# Patient Record
Sex: FEMALE | Race: WHITE | NOT HISPANIC OR LATINO | Employment: FULL TIME | URBAN - METROPOLITAN AREA
[De-identification: names, ages, dates, MRNs, and addresses within clinical notes are randomized per-mention and may not be internally consistent; named-entity substitution may affect disease eponyms.]

---

## 2022-08-11 ENCOUNTER — TELEPHONE (OUTPATIENT)
Dept: SCHEDULING | Facility: IMAGING CENTER | Age: 42
End: 2022-08-11

## 2022-10-03 SDOH — ECONOMIC STABILITY: HOUSING INSECURITY
IN THE LAST 12 MONTHS, WAS THERE A TIME WHEN YOU DID NOT HAVE A STEADY PLACE TO SLEEP OR SLEPT IN A SHELTER (INCLUDING NOW)?: NO

## 2022-10-03 SDOH — ECONOMIC STABILITY: INCOME INSECURITY: IN THE LAST 12 MONTHS, WAS THERE A TIME WHEN YOU WERE NOT ABLE TO PAY THE MORTGAGE OR RENT ON TIME?: NO

## 2022-10-03 SDOH — ECONOMIC STABILITY: INCOME INSECURITY: HOW HARD IS IT FOR YOU TO PAY FOR THE VERY BASICS LIKE FOOD, HOUSING, MEDICAL CARE, AND HEATING?: NOT HARD AT ALL

## 2022-10-03 SDOH — ECONOMIC STABILITY: FOOD INSECURITY: WITHIN THE PAST 12 MONTHS, THE FOOD YOU BOUGHT JUST DIDN'T LAST AND YOU DIDN'T HAVE MONEY TO GET MORE.: NEVER TRUE

## 2022-10-03 SDOH — ECONOMIC STABILITY: HOUSING INSECURITY: IN THE LAST 12 MONTHS, HOW MANY PLACES HAVE YOU LIVED?: 2

## 2022-10-03 SDOH — HEALTH STABILITY: PHYSICAL HEALTH: ON AVERAGE, HOW MANY MINUTES DO YOU ENGAGE IN EXERCISE AT THIS LEVEL?: 20 MIN

## 2022-10-03 SDOH — ECONOMIC STABILITY: FOOD INSECURITY: WITHIN THE PAST 12 MONTHS, YOU WORRIED THAT YOUR FOOD WOULD RUN OUT BEFORE YOU GOT MONEY TO BUY MORE.: NEVER TRUE

## 2022-10-03 SDOH — HEALTH STABILITY: MENTAL HEALTH
STRESS IS WHEN SOMEONE FEELS TENSE, NERVOUS, ANXIOUS, OR CAN'T SLEEP AT NIGHT BECAUSE THEIR MIND IS TROUBLED. HOW STRESSED ARE YOU?: ONLY A LITTLE

## 2022-10-03 SDOH — HEALTH STABILITY: PHYSICAL HEALTH: ON AVERAGE, HOW MANY DAYS PER WEEK DO YOU ENGAGE IN MODERATE TO STRENUOUS EXERCISE (LIKE A BRISK WALK)?: 3 DAYS

## 2022-10-03 SDOH — ECONOMIC STABILITY: TRANSPORTATION INSECURITY
IN THE PAST 12 MONTHS, HAS THE LACK OF TRANSPORTATION KEPT YOU FROM MEDICAL APPOINTMENTS OR FROM GETTING MEDICATIONS?: NO

## 2022-10-03 SDOH — ECONOMIC STABILITY: TRANSPORTATION INSECURITY
IN THE PAST 12 MONTHS, HAS LACK OF TRANSPORTATION KEPT YOU FROM MEETINGS, WORK, OR FROM GETTING THINGS NEEDED FOR DAILY LIVING?: NO

## 2022-10-03 SDOH — ECONOMIC STABILITY: TRANSPORTATION INSECURITY
IN THE PAST 12 MONTHS, HAS LACK OF RELIABLE TRANSPORTATION KEPT YOU FROM MEDICAL APPOINTMENTS, MEETINGS, WORK OR FROM GETTING THINGS NEEDED FOR DAILY LIVING?: NO

## 2022-10-03 ASSESSMENT — LIFESTYLE VARIABLES
HOW OFTEN DO YOU HAVE A DRINK CONTAINING ALCOHOL: 2-3 TIMES A WEEK
AUDIT-C TOTAL SCORE: 3
HOW OFTEN DO YOU HAVE SIX OR MORE DRINKS ON ONE OCCASION: NEVER
HOW MANY STANDARD DRINKS CONTAINING ALCOHOL DO YOU HAVE ON A TYPICAL DAY: 1 OR 2
SKIP TO QUESTIONS 9-10: 1

## 2022-10-03 ASSESSMENT — SOCIAL DETERMINANTS OF HEALTH (SDOH)
HOW OFTEN DO YOU ATTENT MEETINGS OF THE CLUB OR ORGANIZATION YOU BELONG TO?: NEVER
HOW OFTEN DO YOU ATTEND CHURCH OR RELIGIOUS SERVICES?: NEVER
IN A TYPICAL WEEK, HOW MANY TIMES DO YOU TALK ON THE PHONE WITH FAMILY, FRIENDS, OR NEIGHBORS?: ONCE A WEEK
HOW OFTEN DO YOU ATTENT MEETINGS OF THE CLUB OR ORGANIZATION YOU BELONG TO?: NEVER
HOW MANY DRINKS CONTAINING ALCOHOL DO YOU HAVE ON A TYPICAL DAY WHEN YOU ARE DRINKING: 1 OR 2
WITHIN THE PAST 12 MONTHS, YOU WORRIED THAT YOUR FOOD WOULD RUN OUT BEFORE YOU GOT THE MONEY TO BUY MORE: NEVER TRUE
HOW OFTEN DO YOU GET TOGETHER WITH FRIENDS OR RELATIVES?: ONCE A WEEK
IN A TYPICAL WEEK, HOW MANY TIMES DO YOU TALK ON THE PHONE WITH FAMILY, FRIENDS, OR NEIGHBORS?: ONCE A WEEK
DO YOU BELONG TO ANY CLUBS OR ORGANIZATIONS SUCH AS CHURCH GROUPS UNIONS, FRATERNAL OR ATHLETIC GROUPS, OR SCHOOL GROUPS?: NO
ARE YOU MARRIED, WIDOWED, DIVORCED, SEPARATED, NEVER MARRIED, OR LIVING WITH A PARTNER?: LIVING WITH PARTNER
HOW OFTEN DO YOU GET TOGETHER WITH FRIENDS OR RELATIVES?: ONCE A WEEK
ARE YOU MARRIED, WIDOWED, DIVORCED, SEPARATED, NEVER MARRIED, OR LIVING WITH A PARTNER?: LIVING WITH PARTNER
HOW OFTEN DO YOU HAVE SIX OR MORE DRINKS ON ONE OCCASION: NEVER
DO YOU BELONG TO ANY CLUBS OR ORGANIZATIONS SUCH AS CHURCH GROUPS UNIONS, FRATERNAL OR ATHLETIC GROUPS, OR SCHOOL GROUPS?: NO
HOW OFTEN DO YOU ATTEND CHURCH OR RELIGIOUS SERVICES?: NEVER
HOW HARD IS IT FOR YOU TO PAY FOR THE VERY BASICS LIKE FOOD, HOUSING, MEDICAL CARE, AND HEATING?: NOT HARD AT ALL
HOW OFTEN DO YOU HAVE A DRINK CONTAINING ALCOHOL: 2-3 TIMES A WEEK

## 2022-10-06 ENCOUNTER — OFFICE VISIT (OUTPATIENT)
Dept: MEDICAL GROUP | Facility: MEDICAL CENTER | Age: 42
End: 2022-10-06
Payer: COMMERCIAL

## 2022-10-06 VITALS
SYSTOLIC BLOOD PRESSURE: 110 MMHG | DIASTOLIC BLOOD PRESSURE: 58 MMHG | RESPIRATION RATE: 16 BRPM | TEMPERATURE: 98.1 F | HEART RATE: 82 BPM | BODY MASS INDEX: 25.51 KG/M2 | HEIGHT: 66 IN | WEIGHT: 158.73 LBS | OXYGEN SATURATION: 99 %

## 2022-10-06 DIAGNOSIS — Z01.84 IMMUNITY STATUS TESTING: ICD-10-CM

## 2022-10-06 DIAGNOSIS — Z13.220 SCREENING FOR LIPID DISORDERS: ICD-10-CM

## 2022-10-06 DIAGNOSIS — Z30.41 ENCOUNTER FOR SURVEILLANCE OF CONTRACEPTIVE PILLS: ICD-10-CM

## 2022-10-06 DIAGNOSIS — Z23 NEED FOR VACCINATION: ICD-10-CM

## 2022-10-06 DIAGNOSIS — Z00.00 ANNUAL PHYSICAL EXAM: ICD-10-CM

## 2022-10-06 DIAGNOSIS — Z12.31 ENCOUNTER FOR SCREENING MAMMOGRAM FOR BREAST CANCER: ICD-10-CM

## 2022-10-06 DIAGNOSIS — Z13.1 SCREENING FOR DIABETES MELLITUS: ICD-10-CM

## 2022-10-06 PROCEDURE — 90686 IIV4 VACC NO PRSV 0.5 ML IM: CPT | Performed by: FAMILY MEDICINE

## 2022-10-06 PROCEDURE — 90471 IMMUNIZATION ADMIN: CPT | Performed by: FAMILY MEDICINE

## 2022-10-06 PROCEDURE — 99386 PREV VISIT NEW AGE 40-64: CPT | Mod: 25 | Performed by: FAMILY MEDICINE

## 2022-10-06 RX ORDER — DROSPIRENONE AND ETHINYL ESTRADIOL 0.02-3(28)
1 KIT ORAL DAILY
Qty: 84 TABLET | Refills: 3 | Status: SHIPPED | OUTPATIENT
Start: 2022-10-06 | End: 2023-08-30

## 2022-10-06 RX ORDER — DROSPIRENONE AND ETHINYL ESTRADIOL 0.02-3(28)
KIT ORAL
COMMUNITY
Start: 2017-12-15 | End: 2022-10-06 | Stop reason: SDUPTHER

## 2022-10-06 ASSESSMENT — ENCOUNTER SYMPTOMS
WHEEZING: 0
HEMOPTYSIS: 0
SPUTUM PRODUCTION: 0
EYE PAIN: 0
CHILLS: 0
NAUSEA: 0
COUGH: 0
SINUS PAIN: 0
EYE DISCHARGE: 0
SHORTNESS OF BREATH: 0
EYE REDNESS: 0
WEIGHT LOSS: 0
HEADACHES: 0
CONSTIPATION: 0
VOMITING: 0
DIZZINESS: 0
SENSORY CHANGE: 0
DIARRHEA: 0
ABDOMINAL PAIN: 0
FEVER: 0
DEPRESSION: 0
NERVOUS/ANXIOUS: 0
MYALGIAS: 0
FOCAL WEAKNESS: 0
PALPITATIONS: 0

## 2022-10-06 ASSESSMENT — PATIENT HEALTH QUESTIONNAIRE - PHQ9: CLINICAL INTERPRETATION OF PHQ2 SCORE: 0

## 2022-10-06 ASSESSMENT — LIFESTYLE VARIABLES: SUBSTANCE_ABUSE: 0

## 2022-10-06 NOTE — PROGRESS NOTES
42 y.o. female here to establish care, annual.    Chief Complaint.  Chief Complaint   Patient presents with    Establish Care       History of present illness:      Last Pap Smear: 2021  History of abnormal pap smears: 2 in last 10 years, jeffrey came back nornal  Gyn Surgery: None  Current Contraceptive Method: Oral combined birth control.  Sexual history: currently sexually active    Last menstrual period: 2 weeks ago  Periods regular: Yes      Health Maintenance  Advanced directive: n/a  Osteoporosis Screen/ DEXA: n/a  PT/vit D for falls prevention: n/a   Cholesterol Screening: Ordered lipid panel  Diabetes Screening: Ordered A1c  AAA Screening (65 to 74 year male): n/a   Aspirin Use: N/A   Diet: eats healthy diet  Exercise: Is physically active  Substance Abuse: None  Safe in relationship.  Yes  Seat belts, bike helmet, gun safety discussed.  Sun protection used.    Cancer screening  Colorectal Cancer Screening: Discussed colon cancer screening start at age 45  Lung Cancer Screening: She does not smoke.  Cervical Cancer Screening: Pap smear done in 2021, she will send us records on [employer]  Breast Cancer Screening: Mammogram done in January of this year, she will send us records on [employer].  Ordered mammogram for January of next year.      Infectious disease screening/Immunizations  --STI Screening: None  --Immunizations: Vaccine given today, ordered hep B titers.  She got COVID-vaccine as well as tetanus before         Review of systems:    Review of Systems   Constitutional:  Negative for chills, fever, malaise/fatigue and weight loss.   HENT:  Negative for ear discharge, ear pain, hearing loss and sinus pain.    Eyes:  Negative for pain, discharge and redness.   Respiratory:  Negative for cough, hemoptysis, sputum production, shortness of breath and wheezing.    Cardiovascular:  Negative for chest pain, palpitations and leg swelling.   Gastrointestinal:  Negative for abdominal pain, constipation, diarrhea,  "nausea and vomiting.   Genitourinary:  Negative for dysuria, frequency and urgency.   Musculoskeletal:  Negative for joint pain and myalgias.   Skin:  Negative for itching and rash.   Neurological:  Negative for dizziness, sensory change, focal weakness and headaches.   Psychiatric/Behavioral:  Negative for depression and substance abuse. The patient is not nervous/anxious.       Medications and Allergies:     Current Outpatient Medications   Medication Sig Dispense Refill    drospirenone-ethinyl estradiol (VESTURA) 3-0.02 MG per tablet Take 1 Tablet by mouth every day. 84 Tablet 3     No current facility-administered medications for this visit.        Allergies   Allergen Reactions    Penicillins Hives, Itching, Rash and Runny Nose       Vitals:    /58 (BP Location: Left arm, Patient Position: Sitting, BP Cuff Size: Adult)   Pulse 82   Temp 36.7 °C (98.1 °F)   Resp 16   Ht 1.676 m (5' 6\")   Wt 72 kg (158 lb 11.7 oz)   SpO2 99%  Body mass index is 25.62 kg/m².    Physical Exam:   Physical Exam  Constitutional:       General: She is not in acute distress.     Appearance: She is not diaphoretic.   HENT:      Head: Normocephalic and atraumatic.      Right Ear: Tympanic membrane, ear canal and external ear normal.      Left Ear: Tympanic membrane, ear canal and external ear normal.      Nose: Nose normal.      Mouth/Throat:      Mouth: Mucous membranes are moist.      Pharynx: No oropharyngeal exudate or posterior oropharyngeal erythema.   Eyes:      General:         Right eye: No discharge.         Left eye: No discharge.      Conjunctiva/sclera: Conjunctivae normal.   Neck:      Thyroid: No thyromegaly.   Cardiovascular:      Rate and Rhythm: Normal rate and regular rhythm.      Heart sounds: Normal heart sounds. No murmur heard.  Pulmonary:      Effort: Pulmonary effort is normal. No respiratory distress.      Breath sounds: Normal breath sounds. No wheezing or rales.   Abdominal:      General: Bowel " sounds are normal. There is no distension.      Palpations: Abdomen is soft.      Tenderness: There is no abdominal tenderness. There is no guarding.   Musculoskeletal:         General: No deformity. Normal range of motion.      Cervical back: Neck supple.   Skin:     General: Skin is warm.      Findings: No rash.   Neurological:      General: No focal deficit present.      Mental Status: She is alert. Mental status is at baseline.      Gait: Gait is intact.   Psychiatric:         Mood and Affect: Mood and affect normal.         Judgment: Judgment normal.          Assessment/Plan:    Joycelyn was seen today for establish care.    Diagnoses and all orders for this visit:    Annual physical exam  Up-to-date for cancer screenings, flu vaccine given today.  Check hep B titers.  Continue eating healthy diet and exercise 150 minutes in the week.    Encounter for surveillance of contraceptive pills:  On Vestura birth control since 2017.  She is doing well with this birth control.  Has family history of breast cancer.  She did genetic testing before, per patient that genetic testing came back negative for BRCA gene.  No history of blood clots.  She does not smoke.    -     drospirenone-ethinyl estradiol (VESTURA) 3-0.02 MG per tablet; Take 1 Tablet by mouth every day.    Screening for diabetes mellitus  -     Comp Metabolic Panel; Future  -     HEMOGLOBIN A1C; Future    Screening for lipid disorders  -     Lipid Profile; Future    Immunity status testing  -     HEP B SURFACE AB; Future    Encounter for screening mammogram for breast cancer  -     MA-SCREENING MAMMO BILAT W/TOMOSYNTHESIS W/CAD; Future    Need for vaccination  -     INFLUENZA VACCINE QUAD INJ (PF)         Follow up in 1 year for annual physical, sooner if labs are abnormal.

## 2022-10-17 ENCOUNTER — APPOINTMENT (OUTPATIENT)
Dept: LAB | Facility: MEDICAL CENTER | Age: 42
End: 2022-10-17
Attending: FAMILY MEDICINE
Payer: COMMERCIAL

## 2022-10-24 ENCOUNTER — HOSPITAL ENCOUNTER (OUTPATIENT)
Dept: LAB | Facility: MEDICAL CENTER | Age: 42
End: 2022-10-24
Attending: FAMILY MEDICINE
Payer: COMMERCIAL

## 2022-10-24 DIAGNOSIS — Z13.220 SCREENING FOR LIPID DISORDERS: ICD-10-CM

## 2022-10-24 DIAGNOSIS — Z13.1 SCREENING FOR DIABETES MELLITUS: ICD-10-CM

## 2022-10-24 DIAGNOSIS — Z01.84 IMMUNITY STATUS TESTING: ICD-10-CM

## 2022-10-24 LAB
ALBUMIN SERPL BCP-MCNC: 4.4 G/DL (ref 3.2–4.9)
ALBUMIN/GLOB SERPL: 1.7 G/DL
ALP SERPL-CCNC: 51 U/L (ref 30–99)
ALT SERPL-CCNC: 10 U/L (ref 2–50)
ANION GAP SERPL CALC-SCNC: 13 MMOL/L (ref 7–16)
AST SERPL-CCNC: 13 U/L (ref 12–45)
BILIRUB SERPL-MCNC: 0.3 MG/DL (ref 0.1–1.5)
BUN SERPL-MCNC: 11 MG/DL (ref 8–22)
CALCIUM SERPL-MCNC: 9.1 MG/DL (ref 8.5–10.5)
CHLORIDE SERPL-SCNC: 98 MMOL/L (ref 96–112)
CHOLEST SERPL-MCNC: 228 MG/DL (ref 100–199)
CO2 SERPL-SCNC: 21 MMOL/L (ref 20–33)
CREAT SERPL-MCNC: 0.66 MG/DL (ref 0.5–1.4)
EST. AVERAGE GLUCOSE BLD GHB EST-MCNC: 103 MG/DL
GFR SERPLBLD CREATININE-BSD FMLA CKD-EPI: 113 ML/MIN/1.73 M 2
GLOBULIN SER CALC-MCNC: 2.6 G/DL (ref 1.9–3.5)
GLUCOSE SERPL-MCNC: 85 MG/DL (ref 65–99)
HBA1C MFR BLD: 5.2 % (ref 4–5.6)
HBV SURFACE AB SERPL IA-ACNC: 6626 MIU/ML (ref 0–10)
HDLC SERPL-MCNC: 65 MG/DL
LDLC SERPL CALC-MCNC: 134 MG/DL
POTASSIUM SERPL-SCNC: 3.7 MMOL/L (ref 3.6–5.5)
PROT SERPL-MCNC: 7 G/DL (ref 6–8.2)
SODIUM SERPL-SCNC: 132 MMOL/L (ref 135–145)
TRIGL SERPL-MCNC: 143 MG/DL (ref 0–149)

## 2022-10-24 PROCEDURE — 80053 COMPREHEN METABOLIC PANEL: CPT

## 2022-10-24 PROCEDURE — 80061 LIPID PANEL: CPT

## 2022-10-24 PROCEDURE — 86706 HEP B SURFACE ANTIBODY: CPT

## 2022-10-24 PROCEDURE — 83036 HEMOGLOBIN GLYCOSYLATED A1C: CPT

## 2022-10-24 PROCEDURE — 36415 COLL VENOUS BLD VENIPUNCTURE: CPT

## 2023-02-11 ENCOUNTER — APPOINTMENT (OUTPATIENT)
Dept: RADIOLOGY | Facility: IMAGING CENTER | Age: 43
End: 2023-02-11
Attending: PHYSICIAN ASSISTANT
Payer: COMMERCIAL

## 2023-02-11 ENCOUNTER — OFFICE VISIT (OUTPATIENT)
Dept: URGENT CARE | Facility: CLINIC | Age: 43
End: 2023-02-11
Payer: COMMERCIAL

## 2023-02-11 VITALS
WEIGHT: 150 LBS | TEMPERATURE: 98.1 F | SYSTOLIC BLOOD PRESSURE: 106 MMHG | HEART RATE: 68 BPM | OXYGEN SATURATION: 99 % | RESPIRATION RATE: 12 BRPM | HEIGHT: 66 IN | DIASTOLIC BLOOD PRESSURE: 66 MMHG | BODY MASS INDEX: 24.11 KG/M2

## 2023-02-11 DIAGNOSIS — S93.402A SPRAIN OF LEFT ANKLE, UNSPECIFIED LIGAMENT, INITIAL ENCOUNTER: ICD-10-CM

## 2023-02-11 PROCEDURE — 73610 X-RAY EXAM OF ANKLE: CPT | Mod: TC,FY,LT | Performed by: RADIOLOGY

## 2023-02-11 PROCEDURE — 99213 OFFICE O/P EST LOW 20 MIN: CPT | Performed by: PHYSICIAN ASSISTANT

## 2023-02-11 PROCEDURE — 73630 X-RAY EXAM OF FOOT: CPT | Mod: TC,FY,LT | Performed by: RADIOLOGY

## 2023-02-11 ASSESSMENT — ENCOUNTER SYMPTOMS
TINGLING: 0
FALLS: 1
MYALGIAS: 1

## 2023-02-11 NOTE — PROGRESS NOTES
Subjective:     CHIEF COMPLAINT  Chief Complaint   Patient presents with    Ankle Injury     LEFT ankle after injury Tuesday night, foot was pulled down/plantarflexion        HPI  Joycelyn Newton is a very pleasant 43 y.o. female who presents to the clinic after sustaining a left foot and ankle injury 5 days ago.  Patient tripped while walking downstairs.  States her foot was in a plantarflexed and slightly inverted position at the time of the injury.  Currently experiencing pain predominantly over the lateral aspect of the foot and ankle.  She has been experiencing swelling and bruising over the last few days.  Denies any numbness or tingling.  She has been ambulating with a slight limp.  Currently using ice, elevation and anti-inflammatories to decrease pain and swelling.    REVIEW OF SYSTEMS  Review of Systems   Musculoskeletal:  Positive for falls, joint pain and myalgias.   Skin:         Bruising to the left foot and ankle   Neurological:  Negative for tingling.     PAST MEDICAL HISTORY  Patient Active Problem List    Diagnosis Date Noted    Encounter for surveillance of contraceptive pills 10/06/2022       SURGICAL HISTORY   has a past surgical history that includes other orthopedic surgery (Right).    ALLERGIES  Allergies   Allergen Reactions    Penicillins Hives, Itching, Rash and Runny Nose       CURRENT MEDICATIONS  Home Medications       Reviewed by Kamlesh Hassan P.A.-C. (Physician Assistant) on 02/11/23 at 1100  Med List Status: <None>     Medication Last Dose Status   drospirenone-ethinyl estradiol (VESTURA) 3-0.02 MG per tablet Taking Active                    SOCIAL HISTORY  Social History     Tobacco Use    Smoking status: Never    Smokeless tobacco: Never   Substance and Sexual Activity    Alcohol use: Yes     Comment: occ    Drug use: Never    Sexual activity: Yes     Partners: Male     Birth control/protection: Pill     Comment: Work asa director for [employer]       FAMILY  "HISTORY  Family History   Problem Relation Age of Onset    Cancer Mother         Breast cancer    Hyperlipidemia Father     Cancer Maternal Aunt         Breast cancer    Hypertension Maternal Grandfather     Diabetes Paternal Grandmother     Hyperlipidemia Paternal Grandmother     Diabetes Paternal Grandfather     Hyperlipidemia Paternal Grandfather           Objective:     VITAL SIGNS: /66   Pulse 68   Temp 36.7 °C (98.1 °F) (Temporal)   Resp 12   Ht 1.676 m (5' 6\")   Wt 68 kg (150 lb)   LMP 02/04/2023 (Exact Date)   SpO2 99%   Breastfeeding No   BMI 24.21 kg/m²     PHYSICAL EXAM  Physical Exam  Constitutional:       General: She is not in acute distress.     Appearance: Normal appearance. She is not ill-appearing, toxic-appearing or diaphoretic.   HENT:      Head: Normocephalic and atraumatic.   Eyes:      Conjunctiva/sclera: Conjunctivae normal.   Pulmonary:      Effort: Pulmonary effort is normal.   Musculoskeletal:      Cervical back: Normal range of motion.      Comments: Left foot and ankle: There is moderate edema and ecchymosis present diffusely over the left foot.  Mild tenderness to palpation over the lateral malleolus and lateral midfoot.  Tenderness over the ATFL and CFL ligaments.  Range of motion slightly limited in clinic due to pain.  Sensation intact distally.  Antalgic gait.   Skin:     Findings: No bruising, erythema or rash.   Neurological:      General: No focal deficit present.      Mental Status: She is alert and oriented to person, place, and time. Mental status is at baseline.   Psychiatric:         Mood and Affect: Mood normal.         Thought Content: Thought content normal.     RADIOLOGY RESULTS   DX-ANKLE 3+ VIEWS LEFT    Result Date: 2/11/2023 2/11/2023 11:02 AM HISTORY/REASON FOR EXAM:  Left lateral ankle pain and swelling after twisting injury 4 days ago. TECHNIQUE/EXAM DESCRIPTION AND NUMBER OF VIEWS:  3 views of the LEFT ankle. COMPARISON: None FINDINGS: There is " diffuse left ankle. There is no displaced fracture or dislocation. The alignment of the ankle is within normal limits.     1.  No acute displaced left ankle fracture. There is diffuse soft tissue swelling.    DX-FOOT-COMPLETE 3+ LEFT    Result Date: 2/11/2023 2/11/2023 11:03 AM HISTORY/REASON FOR EXAM:  Left lateral foot pain after injury 4 days ago TECHNIQUE/EXAM DESCRIPTION AND NUMBER OF VIEWS: 3 views of the LEFT foot. COMPARISON:  None FINDINGS: There is no focal soft tissue swelling of the foot. There is no evidence of displaced fracture or dislocation. The alignment is maintained.     1.  Unremarkable left foot series.         Assessment/Plan:     1. Sprain of left ankle, unspecified ligament, initial encounter  - DX-FOOT-COMPLETE 3+ LEFT; Future  - DX-ANKLE 3+ VIEWS LEFT; Future      MDM/Comments:    -NSAID's (ibuprofen) and tylenol as directed for pain and inflammation.   -RICE Therapy: Rest, Ice, Compression, Elevation   -Ice 15 to 20 minutes 2-3 times daily to decrease swelling.  -Compression with an elastic bandage for swelling.  -Range of motion exercises.  -Velcro lace up ankle brace provided     Follow up with PCP. Follow up emergently for severe uncontrolled pain, neurovascular compromise (decreased sensation, motion, or circulation). Follow up if symptoms persist for more than six to eight weeks.     Differential diagnosis, natural history, supportive care, and indications for immediate follow-up discussed. All questions answered. Patient agrees with the plan of care.    Follow-up as needed if symptoms worsen or fail to improve to PCP, Urgent care or Emergency Room.    I have personally reviewed prior external notes and test results pertinent to today's visit.  I have independently reviewed and interpreted all diagnostics ordered during this urgent care acute visit.   Discussed management options (risks,benefits, and alternatives to treatment). Pt expresses understanding and the treatment plan was  agreed upon. Questions were encouraged and answered to pt's satisfaction.    Please note that this dictation was created using voice recognition software. I have made a reasonable attempt to correct obvious errors, but I expect that there are errors of grammar and possibly content that I did not discover before finalizing the note.

## 2023-07-24 ENCOUNTER — HOSPITAL ENCOUNTER (OUTPATIENT)
Dept: RADIOLOGY | Facility: MEDICAL CENTER | Age: 43
End: 2023-07-24
Attending: FAMILY MEDICINE
Payer: COMMERCIAL

## 2023-07-24 DIAGNOSIS — Z12.31 ENCOUNTER FOR SCREENING MAMMOGRAM FOR BREAST CANCER: ICD-10-CM

## 2023-07-24 PROCEDURE — 77063 BREAST TOMOSYNTHESIS BI: CPT

## 2023-08-02 ENCOUNTER — HOSPITAL ENCOUNTER (OUTPATIENT)
Dept: RADIOLOGY | Facility: MEDICAL CENTER | Age: 43
End: 2023-08-02
Payer: COMMERCIAL

## 2023-10-12 ENCOUNTER — APPOINTMENT (OUTPATIENT)
Dept: MEDICAL GROUP | Facility: MEDICAL CENTER | Age: 43
End: 2023-10-12
Payer: COMMERCIAL

## 2023-10-30 SDOH — ECONOMIC STABILITY: FOOD INSECURITY: WITHIN THE PAST 12 MONTHS, YOU WORRIED THAT YOUR FOOD WOULD RUN OUT BEFORE YOU GOT MONEY TO BUY MORE.: NEVER TRUE

## 2023-10-30 SDOH — ECONOMIC STABILITY: INCOME INSECURITY: HOW HARD IS IT FOR YOU TO PAY FOR THE VERY BASICS LIKE FOOD, HOUSING, MEDICAL CARE, AND HEATING?: NOT VERY HARD

## 2023-10-30 SDOH — HEALTH STABILITY: MENTAL HEALTH
STRESS IS WHEN SOMEONE FEELS TENSE, NERVOUS, ANXIOUS, OR CAN'T SLEEP AT NIGHT BECAUSE THEIR MIND IS TROUBLED. HOW STRESSED ARE YOU?: TO SOME EXTENT

## 2023-10-30 SDOH — ECONOMIC STABILITY: INCOME INSECURITY: IN THE LAST 12 MONTHS, WAS THERE A TIME WHEN YOU WERE NOT ABLE TO PAY THE MORTGAGE OR RENT ON TIME?: NO

## 2023-10-30 SDOH — ECONOMIC STABILITY: HOUSING INSECURITY: IN THE LAST 12 MONTHS, HOW MANY PLACES HAVE YOU LIVED?: 1

## 2023-10-30 SDOH — ECONOMIC STABILITY: FOOD INSECURITY: WITHIN THE PAST 12 MONTHS, THE FOOD YOU BOUGHT JUST DIDN'T LAST AND YOU DIDN'T HAVE MONEY TO GET MORE.: NEVER TRUE

## 2023-10-30 SDOH — HEALTH STABILITY: PHYSICAL HEALTH: ON AVERAGE, HOW MANY DAYS PER WEEK DO YOU ENGAGE IN MODERATE TO STRENUOUS EXERCISE (LIKE A BRISK WALK)?: 5 DAYS

## 2023-10-30 SDOH — HEALTH STABILITY: PHYSICAL HEALTH: ON AVERAGE, HOW MANY MINUTES DO YOU ENGAGE IN EXERCISE AT THIS LEVEL?: 20 MIN

## 2023-10-30 ASSESSMENT — SOCIAL DETERMINANTS OF HEALTH (SDOH)
IN A TYPICAL WEEK, HOW MANY TIMES DO YOU TALK ON THE PHONE WITH FAMILY, FRIENDS, OR NEIGHBORS?: ONCE A WEEK
HOW OFTEN DO YOU ATTENT MEETINGS OF THE CLUB OR ORGANIZATION YOU BELONG TO?: PATIENT DECLINED
HOW OFTEN DO YOU ATTEND CHURCH OR RELIGIOUS SERVICES?: PATIENT DECLINED
HOW OFTEN DO YOU GET TOGETHER WITH FRIENDS OR RELATIVES?: ONCE A WEEK
HOW OFTEN DO YOU HAVE A DRINK CONTAINING ALCOHOL: 2-3 TIMES A WEEK
HOW OFTEN DO YOU GET TOGETHER WITH FRIENDS OR RELATIVES?: ONCE A WEEK
WITHIN THE PAST 12 MONTHS, YOU WORRIED THAT YOUR FOOD WOULD RUN OUT BEFORE YOU GOT THE MONEY TO BUY MORE: NEVER TRUE
HOW OFTEN DO YOU ATTEND CHURCH OR RELIGIOUS SERVICES?: PATIENT DECLINED
HOW MANY DRINKS CONTAINING ALCOHOL DO YOU HAVE ON A TYPICAL DAY WHEN YOU ARE DRINKING: 1 OR 2
ARE YOU MARRIED, WIDOWED, DIVORCED, SEPARATED, NEVER MARRIED, OR LIVING WITH A PARTNER?: LIVING WITH PARTNER
HOW OFTEN DO YOU HAVE SIX OR MORE DRINKS ON ONE OCCASION: NEVER
HOW HARD IS IT FOR YOU TO PAY FOR THE VERY BASICS LIKE FOOD, HOUSING, MEDICAL CARE, AND HEATING?: NOT VERY HARD
ARE YOU MARRIED, WIDOWED, DIVORCED, SEPARATED, NEVER MARRIED, OR LIVING WITH A PARTNER?: LIVING WITH PARTNER
DO YOU BELONG TO ANY CLUBS OR ORGANIZATIONS SUCH AS CHURCH GROUPS UNIONS, FRATERNAL OR ATHLETIC GROUPS, OR SCHOOL GROUPS?: NO
DO YOU BELONG TO ANY CLUBS OR ORGANIZATIONS SUCH AS CHURCH GROUPS UNIONS, FRATERNAL OR ATHLETIC GROUPS, OR SCHOOL GROUPS?: NO
HOW OFTEN DO YOU ATTENT MEETINGS OF THE CLUB OR ORGANIZATION YOU BELONG TO?: PATIENT DECLINED
IN A TYPICAL WEEK, HOW MANY TIMES DO YOU TALK ON THE PHONE WITH FAMILY, FRIENDS, OR NEIGHBORS?: ONCE A WEEK

## 2023-10-30 ASSESSMENT — LIFESTYLE VARIABLES
AUDIT-C TOTAL SCORE: 3
HOW MANY STANDARD DRINKS CONTAINING ALCOHOL DO YOU HAVE ON A TYPICAL DAY: 1 OR 2
SKIP TO QUESTIONS 9-10: 1
HOW OFTEN DO YOU HAVE SIX OR MORE DRINKS ON ONE OCCASION: NEVER
HOW OFTEN DO YOU HAVE A DRINK CONTAINING ALCOHOL: 2-3 TIMES A WEEK

## 2023-11-01 ENCOUNTER — OFFICE VISIT (OUTPATIENT)
Dept: MEDICAL GROUP | Facility: MEDICAL CENTER | Age: 43
End: 2023-11-01
Payer: COMMERCIAL

## 2023-11-01 VITALS
HEART RATE: 82 BPM | HEIGHT: 66 IN | SYSTOLIC BLOOD PRESSURE: 116 MMHG | BODY MASS INDEX: 27.28 KG/M2 | DIASTOLIC BLOOD PRESSURE: 60 MMHG | WEIGHT: 169.75 LBS | TEMPERATURE: 97.6 F | OXYGEN SATURATION: 98 % | RESPIRATION RATE: 16 BRPM

## 2023-11-01 DIAGNOSIS — Z23 NEED FOR VACCINATION: ICD-10-CM

## 2023-11-01 DIAGNOSIS — R63.5 WEIGHT GAIN: ICD-10-CM

## 2023-11-01 DIAGNOSIS — Z11.59 NEED FOR HEPATITIS C SCREENING TEST: ICD-10-CM

## 2023-11-01 DIAGNOSIS — Z13.1 SCREENING FOR DIABETES MELLITUS: ICD-10-CM

## 2023-11-01 DIAGNOSIS — Z11.4 SCREENING FOR HIV (HUMAN IMMUNODEFICIENCY VIRUS): ICD-10-CM

## 2023-11-01 DIAGNOSIS — Z80.3 FAMILY HISTORY OF BREAST CANCER: ICD-10-CM

## 2023-11-01 DIAGNOSIS — Z30.41 ENCOUNTER FOR SURVEILLANCE OF CONTRACEPTIVE PILLS: ICD-10-CM

## 2023-11-01 DIAGNOSIS — Z00.00 ANNUAL PHYSICAL EXAM: ICD-10-CM

## 2023-11-01 DIAGNOSIS — E78.2 MIXED HYPERLIPIDEMIA: ICD-10-CM

## 2023-11-01 PROCEDURE — 90686 IIV4 VACC NO PRSV 0.5 ML IM: CPT | Performed by: FAMILY MEDICINE

## 2023-11-01 PROCEDURE — 99396 PREV VISIT EST AGE 40-64: CPT | Mod: 25 | Performed by: FAMILY MEDICINE

## 2023-11-01 PROCEDURE — 3078F DIAST BP <80 MM HG: CPT | Performed by: FAMILY MEDICINE

## 2023-11-01 PROCEDURE — 99214 OFFICE O/P EST MOD 30 MIN: CPT | Mod: 25 | Performed by: FAMILY MEDICINE

## 2023-11-01 PROCEDURE — 90471 IMMUNIZATION ADMIN: CPT | Performed by: FAMILY MEDICINE

## 2023-11-01 PROCEDURE — 3074F SYST BP LT 130 MM HG: CPT | Performed by: FAMILY MEDICINE

## 2023-11-01 ASSESSMENT — ENCOUNTER SYMPTOMS
SHORTNESS OF BREATH: 0
ABDOMINAL PAIN: 0
DEPRESSION: 0
CONSTIPATION: 0
EYE REDNESS: 0
FOCAL WEAKNESS: 0
HEADACHES: 0
DIZZINESS: 0
CHILLS: 0
SORE THROAT: 0
SINUS PAIN: 0
SPUTUM PRODUCTION: 0
DIARRHEA: 0
NAUSEA: 0
HEMOPTYSIS: 0
FEVER: 0
EYE PAIN: 0
WHEEZING: 0
COUGH: 0
EYE DISCHARGE: 0
MYALGIAS: 0
NERVOUS/ANXIOUS: 0
SENSORY CHANGE: 0
PALPITATIONS: 0
VOMITING: 0

## 2023-11-01 ASSESSMENT — PATIENT HEALTH QUESTIONNAIRE - PHQ9: CLINICAL INTERPRETATION OF PHQ2 SCORE: 0

## 2023-11-01 NOTE — PROGRESS NOTES
c43 y.o. female for annual routine physical exam, weight gain    Chief Complaint.  Chief Complaint   Patient presents with    Annual Exam       History of present illness:    She reports that she gained about 15 pounds since last year.  She is working full-time and also full-time student.  She is currently on oral combined birth control.  Has family history of breast cancer in mother and maternal aunt.  She did genetic testing in [place] and per patient it was negative for BRCA gene.  Recent milligram came back negative.    She does intermittent fasting and started eating healthy diet and also has been working out.  Last cholesterol panel showed elevated total cholesterol and LDL level    Last Pap Smear: 1 year ago    Health Maintenance  Advanced directive: n/a  Osteoporosis Screen/ DEXA: n/a  PT/vit D for falls prevention: n/a   Cholesterol Screening:   Lab Results   Component Value Date/Time    CHOLSTRLTOT 228 (H) 10/24/2022 1358    TRIGLYCERIDE 143 10/24/2022 1358    HDL 65 10/24/2022 1358     (H) 10/24/2022 1358      Diabetes Screening: Ordered A1c  AAA Screening (65 to 74 year male): n/a   Aspirin Use: n/a    Below anticipatory guidance discussed with patient  Diet:  counseled regarding eating healthy diet  Exercise: Counseled regarding exercise 150 minutes in a week  Substance Abuse: None  Safe in relationship.  Yes  Seat belts, bike helmet, gun safety discussed.  Sun protection use discussed    Cancer screening  Colorectal Cancer Screening: Discussed colon cancer screening start at age 45  Lung Cancer Screening: She does not smoke  Cervical Cancer Screening: Up-to-date for Pap smear, repeat in 3 years from last 1  Breast Cancer Screening: Last mammogram in July 2023 negative, repeat in 1 year        Infectious disease screening/Immunizations  --STI Screening: None  --HIV Screening: Ordered HIV test  --Hepatitis C Screening: Ordered hep C test  --Immunizations: Flu vaccine given today       Review of  "systems:    Review of Systems   Constitutional:  Negative for chills, fever and malaise/fatigue.        Weight gain   HENT:  Negative for ear discharge, ear pain, hearing loss, sinus pain and sore throat.    Eyes:  Negative for pain, discharge and redness.   Respiratory:  Negative for cough, hemoptysis, sputum production, shortness of breath and wheezing.    Cardiovascular:  Negative for chest pain, palpitations and leg swelling.   Gastrointestinal:  Negative for abdominal pain, constipation, diarrhea, nausea and vomiting.   Genitourinary:  Negative for dysuria, frequency and urgency.   Musculoskeletal:  Negative for joint pain and myalgias.   Skin:  Negative for itching and rash.   Neurological:  Negative for dizziness, sensory change, focal weakness and headaches.   Psychiatric/Behavioral:  Negative for depression. The patient is not nervous/anxious.         Medications and Allergies:     Current Outpatient Medications   Medication Sig Dispense Refill    drospirenone-ethinyl estradiol (LORYNA) 3-0.02 MG per tablet TAKE 1 TABLET BY MOUTH EVERY DAY 84 Tablet 0     No current facility-administered medications for this visit.        Allergies   Allergen Reactions    Penicillins Hives, Itching, Rash and Runny Nose       Vitals:    /60   Pulse 82   Temp 36.4 °C (97.6 °F)   Resp 16   Ht 1.676 m (5' 6\")   Wt 77 kg (169 lb 12.1 oz)   SpO2 98%  Body mass index is 27.4 kg/m².    Physical Exam:   Physical Exam  Constitutional:       Appearance: Normal appearance. She is well-developed and well-groomed.   HENT:      Head: Normocephalic and atraumatic.      Right Ear: Tympanic membrane, ear canal and external ear normal.      Left Ear: Tympanic membrane, ear canal and external ear normal.      Nose: Nose normal.      Mouth/Throat:      Mouth: Mucous membranes are moist.      Pharynx: No oropharyngeal exudate or posterior oropharyngeal erythema.   Eyes:      General:         Right eye: No discharge.         Left eye: " No discharge.      Conjunctiva/sclera: Conjunctivae normal.   Neck:      Thyroid: No thyromegaly.   Cardiovascular:      Rate and Rhythm: Normal rate and regular rhythm.      Heart sounds: Normal heart sounds. No murmur heard.  Pulmonary:      Effort: Pulmonary effort is normal. No respiratory distress.      Breath sounds: Normal breath sounds. No wheezing or rales.   Abdominal:      General: Bowel sounds are normal. There is no distension.      Palpations: Abdomen is soft.      Tenderness: There is no abdominal tenderness. There is no guarding.   Musculoskeletal:         General: Normal range of motion.      Cervical back: Neck supple.      Right lower leg: No edema.      Left lower leg: No edema.   Skin:     General: Skin is warm.      Findings: No rash.   Neurological:      General: No focal deficit present.      Mental Status: She is alert. Mental status is at baseline.      Gait: Gait is intact.   Psychiatric:         Mood and Affect: Mood and affect normal.         Behavior: Behavior normal.            Assessment/Plan:    Joycelyn was seen today for annual exam.    Diagnoses and all orders for this visit:    Annual physical exam  Up-to-date for screenings, flu vaccine given today.  Anticipatory guidance discussed above in note.  Need for vaccination  -     INFLUENZA VACCINE QUAD INJ (PF)    Screening for HIV (human immunodeficiency virus)  -     HIV AG/AB COMBO ASSAY SCREENING; Future    Screening for diabetes mellitus  -     HEMOGLOBIN A1C; Future    Need for hepatitis C screening test  -     HEP C VIRUS ANTIBODY; Future    Encounter for surveillance of contraceptive pills  -     Referral to Gynecology    Family history of breast cancer  -     Referral to Gynecology    Referral placed to gynecology for further consultation regarding contraceptive.  She has family history of breast cancer.  Recommended to discuss with them about alternative options like Mirena IUD, Nexplanon.    Mixed hyperlipidemia  Chronic  problem, unstable, recheck labs to assess control.    -     Comp Metabolic Panel; Future  -     Lipid Profile; Future    Weight gain  New problem, unstable, check thyroid level, recommended to do yoga and meditation for stress.  Eat healthy diet and exercise 150 minutes in a week (aerobics and weight training).    -     TSH WITH REFLEX TO FT4; Future         I discussed with patient that annual physical covers screening, immunizations,counseling.  Other problem list that was addressed during this visit including diagnostic labs, diagnostic imagings, medications will be billed as a regular visit which can have copay per patient insurance.  Patient reports understanding of this     Follow up in 1 year for annual physical, sooner as needed

## 2023-11-19 DIAGNOSIS — Z30.41 ENCOUNTER FOR SURVEILLANCE OF CONTRACEPTIVE PILLS: ICD-10-CM

## 2023-11-20 RX ORDER — DROSPIRENONE AND ETHINYL ESTRADIOL TABLETS 0.02-3(28)
1 KIT ORAL
Qty: 84 TABLET | Refills: 0 | Status: SHIPPED | OUTPATIENT
Start: 2023-11-20 | End: 2024-02-15

## 2023-11-21 ENCOUNTER — HOSPITAL ENCOUNTER (OUTPATIENT)
Dept: LAB | Facility: MEDICAL CENTER | Age: 43
End: 2023-11-21
Attending: FAMILY MEDICINE
Payer: COMMERCIAL

## 2023-11-21 DIAGNOSIS — Z11.59 NEED FOR HEPATITIS C SCREENING TEST: ICD-10-CM

## 2023-11-21 DIAGNOSIS — E78.2 MIXED HYPERLIPIDEMIA: ICD-10-CM

## 2023-11-21 DIAGNOSIS — R63.5 WEIGHT GAIN: ICD-10-CM

## 2023-11-21 DIAGNOSIS — Z11.4 SCREENING FOR HIV (HUMAN IMMUNODEFICIENCY VIRUS): ICD-10-CM

## 2023-11-21 DIAGNOSIS — Z13.1 SCREENING FOR DIABETES MELLITUS: ICD-10-CM

## 2023-11-21 LAB
ALBUMIN SERPL BCP-MCNC: 4.5 G/DL (ref 3.2–4.9)
ALBUMIN/GLOB SERPL: 1.6 G/DL
ALP SERPL-CCNC: 63 U/L (ref 30–99)
ALT SERPL-CCNC: 8 U/L (ref 2–50)
ANION GAP SERPL CALC-SCNC: 14 MMOL/L (ref 7–16)
AST SERPL-CCNC: 13 U/L (ref 12–45)
BILIRUB SERPL-MCNC: 0.3 MG/DL (ref 0.1–1.5)
BUN SERPL-MCNC: 10 MG/DL (ref 8–22)
CALCIUM ALBUM COR SERPL-MCNC: 9 MG/DL (ref 8.5–10.5)
CALCIUM SERPL-MCNC: 9.4 MG/DL (ref 8.5–10.5)
CHLORIDE SERPL-SCNC: 100 MMOL/L (ref 96–112)
CHOLEST SERPL-MCNC: 285 MG/DL (ref 100–199)
CO2 SERPL-SCNC: 23 MMOL/L (ref 20–33)
CREAT SERPL-MCNC: 0.66 MG/DL (ref 0.5–1.4)
EST. AVERAGE GLUCOSE BLD GHB EST-MCNC: 103 MG/DL
FASTING STATUS PATIENT QL REPORTED: NORMAL
GFR SERPLBLD CREATININE-BSD FMLA CKD-EPI: 112 ML/MIN/1.73 M 2
GLOBULIN SER CALC-MCNC: 2.9 G/DL (ref 1.9–3.5)
GLUCOSE SERPL-MCNC: 83 MG/DL (ref 65–99)
HBA1C MFR BLD: 5.2 % (ref 4–5.6)
HCV AB SER QL: NORMAL
HDLC SERPL-MCNC: 60 MG/DL
HIV 1+2 AB+HIV1 P24 AG SERPL QL IA: NORMAL
LDLC SERPL CALC-MCNC: 183 MG/DL
POTASSIUM SERPL-SCNC: 3.8 MMOL/L (ref 3.6–5.5)
PROT SERPL-MCNC: 7.4 G/DL (ref 6–8.2)
SODIUM SERPL-SCNC: 137 MMOL/L (ref 135–145)
TRIGL SERPL-MCNC: 210 MG/DL (ref 0–149)
TSH SERPL DL<=0.005 MIU/L-ACNC: 1.81 UIU/ML (ref 0.38–5.33)

## 2023-11-21 PROCEDURE — 80061 LIPID PANEL: CPT

## 2023-11-21 PROCEDURE — 80053 COMPREHEN METABOLIC PANEL: CPT

## 2023-11-21 PROCEDURE — 86803 HEPATITIS C AB TEST: CPT

## 2023-11-21 PROCEDURE — 36415 COLL VENOUS BLD VENIPUNCTURE: CPT

## 2023-11-21 PROCEDURE — 87389 HIV-1 AG W/HIV-1&-2 AB AG IA: CPT

## 2023-11-21 PROCEDURE — 83036 HEMOGLOBIN GLYCOSYLATED A1C: CPT

## 2023-11-21 PROCEDURE — 84443 ASSAY THYROID STIM HORMONE: CPT

## 2023-11-29 ENCOUNTER — OFFICE VISIT (OUTPATIENT)
Dept: MEDICAL GROUP | Facility: MEDICAL CENTER | Age: 43
End: 2023-11-29
Payer: COMMERCIAL

## 2023-11-29 VITALS
BODY MASS INDEX: 27.28 KG/M2 | HEART RATE: 83 BPM | TEMPERATURE: 97.8 F | SYSTOLIC BLOOD PRESSURE: 112 MMHG | OXYGEN SATURATION: 97 % | DIASTOLIC BLOOD PRESSURE: 62 MMHG | HEIGHT: 66 IN | WEIGHT: 169.75 LBS | RESPIRATION RATE: 17 BRPM

## 2023-11-29 DIAGNOSIS — E66.3 OVERWEIGHT (BMI 25.0-29.9): ICD-10-CM

## 2023-11-29 DIAGNOSIS — E78.2 MIXED HYPERLIPIDEMIA: ICD-10-CM

## 2023-11-29 PROCEDURE — 3074F SYST BP LT 130 MM HG: CPT | Performed by: FAMILY MEDICINE

## 2023-11-29 PROCEDURE — 99214 OFFICE O/P EST MOD 30 MIN: CPT | Performed by: FAMILY MEDICINE

## 2023-11-29 PROCEDURE — 3078F DIAST BP <80 MM HG: CPT | Performed by: FAMILY MEDICINE

## 2023-11-29 RX ORDER — ROSUVASTATIN CALCIUM 10 MG/1
10 TABLET, COATED ORAL EVERY EVENING
Qty: 90 TABLET | Refills: 3 | Status: SHIPPED | OUTPATIENT
Start: 2023-11-29

## 2023-11-29 ASSESSMENT — ENCOUNTER SYMPTOMS
PALPITATIONS: 0
CHILLS: 0
FEVER: 0

## 2023-11-29 NOTE — ASSESSMENT & PLAN NOTE
Chronic problem, unstable, start Crestor 10 mg daily.  Recommended to take half tablet for 2 weeks then take 1 tablet.  Discussed side effects of this medication with patient.  Recheck labs in 4 month.

## 2023-11-29 NOTE — PROGRESS NOTES
"FAMILY MEDICINE VISIT                                                               Chief complaint::Diagnoses of Mixed hyperlipidemia and Overweight (BMI 25.0-29.9) were pertinent to this visit.    History of present illness: Joycelyn Newton is a 43 y.o. female who presented for lab follow up.    Problem   Overweight (Bmi 25.0-29.9)    Current BMI is at 27.4.  Weight is stable since I last saw her.  She is eating healthy diet, had made dietary changes.  Has been physically active.  Not able to lose weight     Mixed Hyperlipidemia    Recent cholesterol panel came back significantly elevated.  She has family history of cholesterol elevation.  She made dietary changes and has been exercising    Lab Results   Component Value Date/Time    CHOLSTRLTOT 285 (H) 11/21/2023 1240    TRIGLYCERIDE 210 (H) 11/21/2023 1240    HDL 60 11/21/2023 1240     (H) 11/21/2023 1240                  Review of systems:     Review of Systems   Constitutional:  Negative for chills and fever.   Cardiovascular:  Negative for chest pain, palpitations and leg swelling.        Medications and Allergies:     Current Outpatient Medications   Medication Sig Dispense Refill    rosuvastatin (CRESTOR) 10 MG Tab Take 1 Tablet by mouth every evening. 90 Tablet 3    LORYNA 3-0.02 MG per tablet TAKE 1 TABLET BY MOUTH EVERY DAY 84 Tablet 0     No current facility-administered medications for this visit.          Vitals:    /62   Pulse 83   Temp 36.6 °C (97.8 °F)   Resp 17   Ht 1.676 m (5' 6\")   Wt 77 kg (169 lb 12.1 oz)   SpO2 97%  Body mass index is 27.4 kg/m².    Physical Exam:     Physical Exam  Constitutional:       Appearance: Normal appearance. She is well-developed and well-groomed.   HENT:      Head: Normocephalic and atraumatic.      Right Ear: External ear normal.      Left Ear: External ear normal.   Eyes:      General:         Right eye: No discharge.         Left eye: No discharge.      Conjunctiva/sclera: Conjunctivae " normal.   Cardiovascular:      Rate and Rhythm: Normal rate.   Pulmonary:      Effort: Pulmonary effort is normal. No respiratory distress.   Musculoskeletal:      Cervical back: Neck supple.   Skin:     Findings: No rash.   Neurological:      Mental Status: She is alert.   Psychiatric:         Mood and Affect: Mood and affect normal.         Behavior: Behavior normal.          Labs:  I reviewed with patient recent labs resulted on  11/21/2023.    Assessment/Plan:         Problem List Items Addressed This Visit       Mixed hyperlipidemia     Chronic problem, unstable, start Crestor 10 mg daily.  Recommended to take half tablet for 2 weeks then take 1 tablet.  Discussed side effects of this medication with patient.  Recheck labs in 4 month.         Relevant Medications    rosuvastatin (CRESTOR) 10 MG Tab    Other Relevant Orders    Comp Metabolic Panel    Lipid Profile    Overweight (BMI 25.0-29.9)     Chronic problem, unstable, referral to weight loss program placed.             Please note that this dictation was created using voice recognition software. I have made every reasonable attempt to correct obvious errors, but I expect that there are errors of grammar and possibly content that I did not discover before finalizing the note.    Follow up in 4 months for lab follow up.

## 2024-02-15 DIAGNOSIS — Z30.41 ENCOUNTER FOR SURVEILLANCE OF CONTRACEPTIVE PILLS: ICD-10-CM

## 2024-02-15 RX ORDER — DROSPIRENONE AND ETHINYL ESTRADIOL TABLETS 0.02-3(28)
1 KIT ORAL
Qty: 84 TABLET | Refills: 3 | Status: SHIPPED | OUTPATIENT
Start: 2024-02-15

## 2024-02-15 NOTE — TELEPHONE ENCOUNTER
Received request via: Pharmacy    Was the patient seen in the last year in this department? Yes    Does the patient have an active prescription (recently filled or refills available) for medication(s) requested? No    Pharmacy Name: Pharmacy:   [id number] #[id number] - [place], [place] - [address]     Does the patient have shelter Plus and need 100 day supply (blood pressure, diabetes and cholesterol meds only)? Patient does not have SCP

## 2024-03-08 ENCOUNTER — HOSPITAL ENCOUNTER (OUTPATIENT)
Dept: LAB | Facility: MEDICAL CENTER | Age: 44
End: 2024-03-08
Attending: FAMILY MEDICINE
Payer: COMMERCIAL

## 2024-03-08 DIAGNOSIS — E78.2 MIXED HYPERLIPIDEMIA: ICD-10-CM

## 2024-03-08 LAB
ALBUMIN SERPL BCP-MCNC: 4.5 G/DL (ref 3.2–4.9)
ALBUMIN/GLOB SERPL: 1.6 G/DL
ALP SERPL-CCNC: 61 U/L (ref 30–99)
ALT SERPL-CCNC: 14 U/L (ref 2–50)
ANION GAP SERPL CALC-SCNC: 13 MMOL/L (ref 7–16)
AST SERPL-CCNC: 23 U/L (ref 12–45)
BILIRUB SERPL-MCNC: 0.4 MG/DL (ref 0.1–1.5)
BUN SERPL-MCNC: 7 MG/DL (ref 8–22)
CALCIUM ALBUM COR SERPL-MCNC: 9.2 MG/DL (ref 8.5–10.5)
CALCIUM SERPL-MCNC: 9.6 MG/DL (ref 8.5–10.5)
CHLORIDE SERPL-SCNC: 101 MMOL/L (ref 96–112)
CHOLEST SERPL-MCNC: 191 MG/DL (ref 100–199)
CO2 SERPL-SCNC: 24 MMOL/L (ref 20–33)
CREAT SERPL-MCNC: 0.61 MG/DL (ref 0.5–1.4)
FASTING STATUS PATIENT QL REPORTED: NORMAL
GFR SERPLBLD CREATININE-BSD FMLA CKD-EPI: 114 ML/MIN/1.73 M 2
GLOBULIN SER CALC-MCNC: 2.9 G/DL (ref 1.9–3.5)
GLUCOSE SERPL-MCNC: 89 MG/DL (ref 65–99)
HDLC SERPL-MCNC: 67 MG/DL
LDLC SERPL CALC-MCNC: 96 MG/DL
POTASSIUM SERPL-SCNC: 4.4 MMOL/L (ref 3.6–5.5)
PROT SERPL-MCNC: 7.4 G/DL (ref 6–8.2)
SODIUM SERPL-SCNC: 138 MMOL/L (ref 135–145)
TRIGL SERPL-MCNC: 138 MG/DL (ref 0–149)

## 2024-03-08 PROCEDURE — 80061 LIPID PANEL: CPT

## 2024-03-08 PROCEDURE — 80053 COMPREHEN METABOLIC PANEL: CPT

## 2024-03-08 PROCEDURE — 36415 COLL VENOUS BLD VENIPUNCTURE: CPT

## 2024-03-26 ENCOUNTER — OFFICE VISIT (OUTPATIENT)
Dept: MEDICAL GROUP | Facility: MEDICAL CENTER | Age: 44
End: 2024-03-26
Payer: COMMERCIAL

## 2024-03-26 VITALS
DIASTOLIC BLOOD PRESSURE: 60 MMHG | SYSTOLIC BLOOD PRESSURE: 102 MMHG | WEIGHT: 176.37 LBS | BODY MASS INDEX: 28.34 KG/M2 | HEIGHT: 66 IN | TEMPERATURE: 97.5 F | OXYGEN SATURATION: 96 % | HEART RATE: 83 BPM | RESPIRATION RATE: 16 BRPM

## 2024-03-26 DIAGNOSIS — E78.2 MIXED HYPERLIPIDEMIA: ICD-10-CM

## 2024-03-26 DIAGNOSIS — M25.562 ACUTE PAIN OF LEFT KNEE: ICD-10-CM

## 2024-03-26 PROCEDURE — 3074F SYST BP LT 130 MM HG: CPT | Performed by: FAMILY MEDICINE

## 2024-03-26 PROCEDURE — 99214 OFFICE O/P EST MOD 30 MIN: CPT | Performed by: FAMILY MEDICINE

## 2024-03-26 PROCEDURE — 3078F DIAST BP <80 MM HG: CPT | Performed by: FAMILY MEDICINE

## 2024-03-26 ASSESSMENT — ENCOUNTER SYMPTOMS
PALPITATIONS: 0
CHILLS: 0
FEVER: 0

## 2024-03-26 ASSESSMENT — PATIENT HEALTH QUESTIONNAIRE - PHQ9: CLINICAL INTERPRETATION OF PHQ2 SCORE: 0

## 2024-03-26 NOTE — PROGRESS NOTES
Verbal consent was acquired by the patient to use [employer] ambient listening note generation during this visit.      Joycelyn was seen today for follow-up, lab results and referral needed.    Diagnoses and all orders for this visit:    Acute pain of left knee  -     Referral to Orthopedics    Mixed hyperlipidemia  -     Comp Metabolic Panel; Future  -     Lipid Profile; Future                  Assessment & Plan  1. Knee pain, left side.  This is a new condition, unstable. She would like to see ortho for that. Referral placed to ortho for further evaluation and management for this condition.    2. Mixed hyperlipidemia.  Chronic condition, stable. Recent cholesterol panels came back significantly improved. Goal LDL is less than 70. Continue rosuvastatin 10 mg daily. I will recheck labs in 1 year.    Follow-up  The patient will follow up in 1 year for annual physical, sooner as needed.          Chief complaint::Diagnoses of Acute pain of left knee and Mixed hyperlipidemia were pertinent to this visit.      History of Present Illness  The patient is a 44-year-old female who is here for a blood test follow-up that was done on 03/08/2024 and left knee pain.    When she steps wrong, she gets a sharp pain across the front of her knee. It feels weak. If she has to walk for a few minutes, the stiffness will go away. She rolled her ankle in 02/2023 and her ankle got weak. Her ankle felt better in 08/2023 or 09/2023. She started trying to work out again and that is when she noticed the pain. It is not getting any better. She denies any direct trauma, redness, or swelling to her knee. She damaged her knee while skiing when she was in high school. She has always been more careful with her left knee. She does not have pain now. She hikes a lot and has to be careful.    She denies any side effects from her medication.      Review of Systems   Constitutional:  Negative for chills and fever.   Cardiovascular:  Negative for chest  "pain, palpitations and leg swelling.   Musculoskeletal:  Positive for joint pain.        Left knee pain          Medications and Allergies:     Current Outpatient Medications   Medication Sig Dispense Refill    drospirenone-ethinyl estradiol (LORYNA) 3-0.02 MG per tablet TAKE 1 TABLET BY MOUTH EVERY DAY 84 Tablet 3    rosuvastatin (CRESTOR) 10 MG Tab Take 1 Tablet by mouth every evening. 90 Tablet 3     No current facility-administered medications for this visit.       /60   Pulse 83   Temp 36.4 °C (97.5 °F)   Resp 16   Ht 1.676 m (5' 6\")   Wt 80 kg (176 lb 5.9 oz)   SpO2 96% , Body mass index is 28.47 kg/m².      Physical Exam  Constitutional:       Appearance: Normal appearance. She is well-developed and well-groomed.   HENT:      Head: Normocephalic and atraumatic.      Right Ear: External ear normal.      Left Ear: External ear normal.   Eyes:      General:         Right eye: No discharge.         Left eye: No discharge.      Conjunctiva/sclera: Conjunctivae normal.   Cardiovascular:      Rate and Rhythm: Normal rate.   Pulmonary:      Effort: Pulmonary effort is normal. No respiratory distress.   Musculoskeletal:      Cervical back: Neck supple.      Comments: Tender to palpate at anterior aspect of left knee, no swelling.  Full range of motion at left knee.   Skin:     Findings: No rash.   Neurological:      Mental Status: She is alert.   Psychiatric:         Mood and Affect: Mood and affect normal.         Behavior: Behavior normal.              Results  Laboratory Studies  Labs from 3/8/2024 were reviewed with the patient.          Please note that this dictation was created using voice recognition software. I have made every reasonable attempt to correct obvious errors, but I expect that there are errors of grammar and possibly content that I did not discover before finalizing the note.      "

## 2024-05-22 ENCOUNTER — OFFICE VISIT (OUTPATIENT)
Dept: OBGYN | Facility: CLINIC | Age: 44
End: 2024-05-22
Payer: COMMERCIAL

## 2024-05-22 ENCOUNTER — HOSPITAL ENCOUNTER (OUTPATIENT)
Facility: MEDICAL CENTER | Age: 44
End: 2024-05-22
Attending: PHYSICIAN ASSISTANT
Payer: COMMERCIAL

## 2024-05-22 VITALS
SYSTOLIC BLOOD PRESSURE: 128 MMHG | BODY MASS INDEX: 28.93 KG/M2 | DIASTOLIC BLOOD PRESSURE: 89 MMHG | WEIGHT: 180 LBS | HEIGHT: 66 IN

## 2024-05-22 DIAGNOSIS — Z12.4 SCREENING FOR CERVICAL CANCER: ICD-10-CM

## 2024-05-22 DIAGNOSIS — Z01.419 WELL WOMAN EXAM: ICD-10-CM

## 2024-05-22 DIAGNOSIS — Z30.09 BIRTH CONTROL COUNSELING: ICD-10-CM

## 2024-05-22 DIAGNOSIS — Z12.31 ENCOUNTER FOR SCREENING MAMMOGRAM FOR BREAST CANCER: ICD-10-CM

## 2024-05-22 PROCEDURE — 3079F DIAST BP 80-89 MM HG: CPT | Performed by: PHYSICIAN ASSISTANT

## 2024-05-22 PROCEDURE — 3074F SYST BP LT 130 MM HG: CPT | Performed by: PHYSICIAN ASSISTANT

## 2024-05-22 PROCEDURE — 99386 PREV VISIT NEW AGE 40-64: CPT | Performed by: PHYSICIAN ASSISTANT

## 2024-05-22 NOTE — PROGRESS NOTES
ANNUAL GYNECOLOGY VISIT    Chief Complaint  Annual Exam    Subjective  Joycelyn Newton is a 44 y.o. female  Patient's last menstrual period was 2024 (exact date). on nothing for contraception who presents today for Annual Exam.     Pt wanting to discuss options for contraception. Previously on OCP for many years, stopped 6 weeks ago as she is worried about taking estrogen due to reading about side effects. Not planning on pregnancy. Family hx of VTE and CVD. Hx of migraines with aura.     Last Pap was in [place] in . Was never told of abnormal results but has results on her phone showing LSIL with neg HPV. Hx of positive HPV in .      Preventive Care   Immunization History   Administered Date(s) Administered    Covid-19 Mrna (Spikevax) Moderna 12+ Years 2023    Influenza Vaccine Quad Inj (Pf) 10/06/2022, 2023    PFIZER BIVALENT SARS-COV-2 VACCINE (12+) 2021    PFIZER GOMEZ CAP SARS-COV-2 VACCINATION (12+) 04/10/2021    PFIZER PURPLE CAP SARS-COV-2 VACCINATION (12+) 10/28/2021    Tdap Vaccine 2017     Last Pap: 21 LSIL with neg HPV, brings records from previous PCP in [place]   Any abnormal pap smears?  yes - HPV in    Last Mammogram: 23  Last Colonoscopy: n/a  Last DEXA: n/a      Gynecology History  Current Sexual Activity: yes - monogamous male partner   History of sexually transmitted diseases? no  Abnormal discharge? no  Menopause: no  Postmenopausal bleeding: n/a    Menstrual History  Patient's last menstrual period was 2024 (exact date).  Periods are regular every 28-30 days, lasting 3-4 days.   Dysmenorrhea :none. Cyclic symptoms include none.   No intermenstrual bleeding, spotting, or discharge.  PMS symptoms?  no    Contraception  Current: none  Past: OCP      Cancer Risk Assessement:  Family history of:   - Breast cancer: Mother at 61   - Ovarian cancer: Maternal Aunt    - Uterine cancer: no   - Colon cancer: no  Pt with negative BRCA  "testing     Obstetric History  OB History    Para Term  AB Living   0 0 0 0 0 0   SAB IAB Ectopic Molar Multiple Live Births   0 0 0 0 0 0       Past Medical History  Past Medical History:   Diagnosis Date    Allergy     Migraine        Past Surgical History  Past Surgical History:   Procedure Laterality Date    BRCA1&2 GEN FULL SEQ DUP/DEL      OPEN REDUCTION      OTHER ORTHOPEDIC SURGERY Right     Right finger fracture repair       Social History  Social History     Tobacco Use    Smoking status: Never    Smokeless tobacco: Never   Vaping Use    Vaping status: Never Used   Substance Use Topics    Alcohol use: Yes     Alcohol/week: 0.6 - 1.2 oz     Types: 1 - 2 Glasses of wine per week     Comment: occ    Drug use: Never        Family History  Family History   Problem Relation Age of Onset    Cancer Mother         Breast cancer    Breast Cancer Mother     Diabetes Mother     Hyperlipidemia Mother     Hyperlipidemia Father     Cancer Maternal Aunt         Breast cancer    Hypertension Maternal Grandfather     Alcohol abuse Maternal Grandfather     Diabetes Paternal Grandmother     Hyperlipidemia Paternal Grandmother     Diabetes Paternal Grandfather     Hyperlipidemia Paternal Grandfather     Stroke Maternal Grandmother     Glaucoma Maternal Grandmother     Breast Cancer Maternal Aunt        Home Medications  Current Outpatient Medications   Medication Sig    rosuvastatin (CRESTOR) 10 MG Tab Take 1 Tablet by mouth every evening.       Allergies/Reactions  Allergies   Allergen Reactions    Penicillins Hives, Itching, Rash and Runny Nose       ROS  Review of Systems:  Gen: no fevers or chills, no significant weight loss or gain  Respiratory:  no cough or dyspnea  Cardiac:  no chest pain, no palpitations, no syncope  GI:  no heartburn, no abdominal pain, no nausea or vomiting  Psych: no depression or anxiety    Objective  /89   Ht 5' 6\"   Wt 180 lb   LMP 2024 (Exact Date)   BMI 29.05 kg/m² "     Constitutional: The patient is well developed and well nourished.  Psychiatric: Patient is oriented to time place and person.   Skin: No rash observed.  Neck: Appears symmetric. Thyroid normal size  Respiratory: normal effort  Breast: Inspection reveals no asymetry or nipple discharge, no skin thickening, dimpling or erythema.  Palpation demonstrates no masses.  Abdomen: Soft, non-tender.  Pelvic Exam:      Vulva: external female genitalia are normal in appearance. No lesions     Urethra - no lesions, no erythema     Vagina: moist, pink, normal ruggae     Cervix: pink, 2 white cystic lesions c/w nabothian cysts at 3 and 4 o'clock, no lesions, no CMT     Uterus - non-tender, normal size, shape, contour, mobile, anteverted     Ovaries: non-tender, no appreciable masses   Pap Smear performed: Yes   Chaperone Present: ROHIT Garrison  Extremities: Legs are symmetric and without tenderness. There is no edema present.      Assessment & Plan  Joycelyn Newton is a 44 y.o. female who presents today for Annual Gyn Exam.     1. Health Maintenance   PAP: THINPREP PAP W/HPV AND CTNG; collected   Reviewed abnormal Pap from 2021 with pt. Unfortunately, she was never informed of abnormal result. Since it has been 3 years will repeat Pap today and advise of recommendation based on results per ASCCP guidelines. Discussed pending results she may need colpo, education provided on what to expect for this procedure.   STI Screen (HIV, Syphilis, Chlamydia / Gonorrhea): declines   MAMMOGRAM: MA-SCREENING MAMMO BILAT W/TOMOSYNTHESIS W/CAD;   COLONOSCOPY: n/a  DEXA: n/a  IMMUNIZATIONS: UTD  TOBACCO: Never   DIABETES SCREEN: A1c 5.2% 11/21/23  CHOLESTEROL SCREEN: checked by PCP 3/2024 WNL   COUNSELING: breast self exam, mammography screening, use and side effects of OCPs, family planning choices, and menopause    2. Contraceptive counseling     - Birth control options discussed in detail including pill, patch, ring, shot, implant, IUD  including use and adverse effects of each option. Advised combo contraception such as OCP, patch, and ring are contraindicated due to migraines with aura. Dicussed that progesterone only options, namely shot and implant are most associated with weight gain. Discussed abnormal bleeding associated progesterone only options, often being most common reason for stopping/removal. Patient with no history of uncontrolled HTN or DM, denies history of migraines with aura or blood clots. Discussed possible SE of any hormonal contraception may include: N/V, HA, menstrual changes, weight fluctuation, breast tenderness, abdominal pain, anxiety or depression, DVT. Counseled that contraception does not protect against STDs and condom use was recommended  - Pt considering Nexplanon implant. Will schedule a procedure appointment to get this placed.    - Educated also briefly provided on timing, symptoms, and treatment options for menopause.           Return: Annually or PRN    Alejandra Klein P.A.-C.  [facility]   5/22/2024     unknown

## 2024-05-22 NOTE — PROGRESS NOTES
Patient here for GYN exam.  LMP= 5/2/24  BCM:NONE  Last pap: 2/1/2021 wnl   Last mammogram if applies: 8/2/23   Phone number: [phone] (home) [phone] (work)  Pharmacy verified    Pt wants to talk about BC methods.

## 2024-05-23 DIAGNOSIS — Z12.4 SCREENING FOR CERVICAL CANCER: ICD-10-CM

## 2024-05-29 LAB
C TRACH RRNA CVX QL NAA+PROBE: NEGATIVE
CYTOLOGIST CVX/VAG CYTO: ABNORMAL
CYTOLOGY CVX/VAG DOC CYTO: ABNORMAL
CYTOLOGY CVX/VAG DOC THIN PREP: ABNORMAL
HPV I/H RISK 4 DNA CVX QL PROBE+SIG AMP: POSITIVE
HPV16 DNA CVX QL PROBE+SIG AMP: NEGATIVE
HPV18+45 E6+E7 MRNA CVX QL NAA+PROBE: NEGATIVE
N GONORRHOEA RRNA CVX QL NAA+PROBE: NEGATIVE
NOTE NL11727A: ABNORMAL
OTHER STN SPEC: ABNORMAL
STAT OF ADQ CVX/VAG CYTO-IMP: ABNORMAL

## 2024-05-30 PROBLEM — B97.7 HPV (HUMAN PAPILLOMA VIRUS) INFECTION: Status: ACTIVE | Noted: 2024-05-30

## 2024-07-02 ENCOUNTER — GYNECOLOGY VISIT (OUTPATIENT)
Dept: OBGYN | Facility: CLINIC | Age: 44
End: 2024-07-02
Payer: COMMERCIAL

## 2024-07-02 VITALS
SYSTOLIC BLOOD PRESSURE: 123 MMHG | BODY MASS INDEX: 28.28 KG/M2 | WEIGHT: 176 LBS | HEIGHT: 66 IN | DIASTOLIC BLOOD PRESSURE: 78 MMHG

## 2024-07-02 DIAGNOSIS — Z30.017 NEXPLANON INSERTION: ICD-10-CM

## 2024-07-02 LAB
POCT INT CON NEG: NEGATIVE
POCT INT CON POS: POSITIVE
POCT URINE PREGNANCY TEST: NEGATIVE

## 2024-07-02 PROCEDURE — 11981 INSERTION DRUG DLVR IMPLANT: CPT | Performed by: PHYSICIAN ASSISTANT

## 2024-07-02 PROCEDURE — 81025 URINE PREGNANCY TEST: CPT | Performed by: PHYSICIAN ASSISTANT

## 2024-10-06 DIAGNOSIS — E78.2 MIXED HYPERLIPIDEMIA: ICD-10-CM

## 2024-10-07 RX ORDER — ROSUVASTATIN CALCIUM 10 MG/1
10 TABLET, COATED ORAL EVERY EVENING
Qty: 90 TABLET | Refills: 3 | Status: SHIPPED | OUTPATIENT
Start: 2024-10-07

## 2024-10-11 ENCOUNTER — OFFICE VISIT (OUTPATIENT)
Dept: MEDICAL GROUP | Facility: MEDICAL CENTER | Age: 44
End: 2024-10-11
Payer: COMMERCIAL

## 2024-10-11 VITALS
DIASTOLIC BLOOD PRESSURE: 68 MMHG | OXYGEN SATURATION: 95 % | WEIGHT: 182.1 LBS | TEMPERATURE: 98.1 F | SYSTOLIC BLOOD PRESSURE: 124 MMHG | HEART RATE: 80 BPM | BODY MASS INDEX: 29.27 KG/M2 | HEIGHT: 66 IN

## 2024-10-11 DIAGNOSIS — J30.2 SEASONAL ALLERGIES: ICD-10-CM

## 2024-10-11 DIAGNOSIS — R09.81 SINUS CONGESTION: ICD-10-CM

## 2024-10-11 DIAGNOSIS — J00 ACUTE RHINITIS: ICD-10-CM

## 2024-10-11 DIAGNOSIS — R05.8 DRY COUGH: ICD-10-CM

## 2024-10-11 DIAGNOSIS — J02.9 SORE THROAT: ICD-10-CM

## 2024-10-11 LAB
FLUAV RNA SPEC QL NAA+PROBE: NEGATIVE
FLUBV RNA SPEC QL NAA+PROBE: NEGATIVE
RSV RNA SPEC QL NAA+PROBE: NEGATIVE
S PYO DNA SPEC NAA+PROBE: NOT DETECTED
SARS-COV-2 RNA RESP QL NAA+PROBE: NEGATIVE

## 2024-10-11 PROCEDURE — 87651 STREP A DNA AMP PROBE: CPT | Performed by: NURSE PRACTITIONER

## 2024-10-11 PROCEDURE — 3078F DIAST BP <80 MM HG: CPT | Performed by: NURSE PRACTITIONER

## 2024-10-11 PROCEDURE — 3074F SYST BP LT 130 MM HG: CPT | Performed by: NURSE PRACTITIONER

## 2024-10-11 PROCEDURE — 0241U POCT CEPHEID COV-2, FLU A/B, RSV - PCR: CPT | Performed by: NURSE PRACTITIONER

## 2024-10-11 PROCEDURE — 99213 OFFICE O/P EST LOW 20 MIN: CPT | Performed by: NURSE PRACTITIONER

## 2024-10-11 RX ORDER — BENZONATATE 100 MG/1
100 CAPSULE ORAL 3 TIMES DAILY PRN
Qty: 60 CAPSULE | Refills: 0 | Status: SHIPPED | OUTPATIENT
Start: 2024-10-11

## 2024-10-11 RX ORDER — GUAIFENESIN 1200 MG/1
1200 TABLET, EXTENDED RELEASE ORAL
Qty: 28 TABLET | Refills: 0 | Status: SHIPPED | OUTPATIENT
Start: 2024-10-11

## 2024-10-11 ASSESSMENT — ENCOUNTER SYMPTOMS
COUGH: 1
PALPITATIONS: 0
CHILLS: 0
FEVER: 0
SORE THROAT: 1

## 2024-10-16 ENCOUNTER — APPOINTMENT (OUTPATIENT)
Dept: RADIOLOGY | Facility: MEDICAL CENTER | Age: 44
End: 2024-10-16
Attending: PHYSICIAN ASSISTANT
Payer: COMMERCIAL

## 2024-10-21 ENCOUNTER — APPOINTMENT (OUTPATIENT)
Dept: RADIOLOGY | Facility: MEDICAL CENTER | Age: 44
End: 2024-10-21
Attending: PHYSICIAN ASSISTANT
Payer: COMMERCIAL

## 2024-10-21 DIAGNOSIS — Z12.31 ENCOUNTER FOR SCREENING MAMMOGRAM FOR BREAST CANCER: ICD-10-CM

## 2024-10-21 PROCEDURE — 77067 SCR MAMMO BI INCL CAD: CPT
